# Patient Record
Sex: FEMALE | Race: BLACK OR AFRICAN AMERICAN | NOT HISPANIC OR LATINO | Employment: OTHER | URBAN - METROPOLITAN AREA
[De-identification: names, ages, dates, MRNs, and addresses within clinical notes are randomized per-mention and may not be internally consistent; named-entity substitution may affect disease eponyms.]

---

## 2019-10-02 ENCOUNTER — TELEPHONE (OUTPATIENT)
Dept: PHARMACY | Facility: CLINIC | Age: 59
End: 2019-10-02

## 2019-10-08 NOTE — TELEPHONE ENCOUNTER
Documentation Only: Prior Authorization for Epclusa IS NOT required Patient max co-pay: $3 with Medicaid Patient Assistance IS NOT required. Forwarding to clinical pharmacist for consult and shipment. CRISTELA

## 2019-10-10 ENCOUNTER — TELEPHONE (OUTPATIENT)
Dept: PHARMACY | Facility: CLINIC | Age: 59
End: 2019-10-10

## 2019-10-10 NOTE — TELEPHONE ENCOUNTER
(ag) Epclusa Initial:  Initial (ag)Epclusa consult completed on 10/10. Epclusa will be shipped on 10/10 to arrive on 10/11 at patient's home via [employer]. $0.00 from -001.  Patient plans to start Epclusa on 10/ Confirmed 2 patient identifies - name and . Therapy appropriate.      Counseling was reviewed: (ag)Epclusa - 12 weeks       1. Patient MUST take Epclusa at the same time every day with or without food.       2. Patient MUST avoid acid reducers without consulting myself or provider first. Antacids are to be spaced out at least 4 hours apart from Epclusa.      3. Potential side effects include: headaches and fatigue.  Headache: Patient may treat with OTC remedies. If Tylenol is used, dose should not exceed 2000mg per day.      4.Medication list reviewed. No allergies noted. Patient MUST contact myself or provider prior to starting any new OTC, herbal, or prescription drugs to avoid potential DDIs.     DDI's:   Atorvastatin - Patient will administer 10 mg (1 half) tablet during on (ag) Epclusa therapy.  Ranitidine - patient reports she is no longer adminstering ranitidine, patient advised that if she decides to take, she must adminster at the same time as (ag) Epclusa  Patient reported taking an antacid medication that is not on medication list, patient reports she will call back with the name.       Discussed the importance of staying well hydrated while on therapy. Compliance stressed, patient to take missed doses as soon as remember, but NOT to take 2 doses in one day. Patient will report questions or concerns to myself or practitioner.  Patient verbalizes understanding. Patient plans to start Epclusa on 10/11.  Consultation included: indication, goals of/ treatment, administration, storage and handling, side effects, how to handle missed doses, the importance of laboratory monitoring, the importance of keeping all follow up appointment. Patient should avoid sharing personal products such as razors,  toothbrushes, etc. Patient understands to report any medication changes to [facility] and provider All questions answered and addressed to patient's satisfaction, Follow-up will occur 7 days from start of treatment, [facility] to contact patient in 3 weeks for refills.  Provider notified of anticipated start date.

## 2019-10-14 ENCOUNTER — TELEPHONE (OUTPATIENT)
Dept: PHARMACY | Facility: CLINIC | Age: 59
End: 2019-10-14

## 2019-10-14 NOTE — TELEPHONE ENCOUNTER
Pt called on call Pharmacist 10/12/19 to inquire about starting AG Epclusa on 10/14 instead of 10/12.  Pt also reported now taking amitriptyline in the evenings.  Advised pt starting 10/14 was fine and no ddis with amitriptyline.

## 2019-10-21 ENCOUNTER — TELEPHONE (OUTPATIENT)
Dept: PHARMACY | Facility: CLINIC | Age: 59
End: 2019-10-21

## 2019-10-21 NOTE — TELEPHONE ENCOUNTER
Initial touch base conducted for AG Epclusa - Name/ confirmed.  Confirmed patient started medication as instructed on 10/14.  Patient confirms that they are taking AG Epclusa every day at 9am.  Patient denies skipping or missing doses.  Patient reports experiencing no side effects since beginning therapy. Patient reports no new medications, otc remedies, or allergies. Patient reminded of lab appointments.  Patient counseled on importance of maintaining adherence and keeping lab appointments which were scheduled.  Advised to call [facility] and provider if any issues arise. Aware [facility] will call for refills when patient has 7 days of medication on hand.    Pt asked if she could drink beer while on Epclusa. We advised pt that she should avoid alcohol while taking her Epclusa. Reinforced the importance of staying well hydrated during treatment.

## 2019-11-04 ENCOUNTER — TELEPHONE (OUTPATIENT)
Dept: PHARMACY | Facility: CLINIC | Age: 59
End: 2019-11-04

## 2019-11-04 NOTE — TELEPHONE ENCOUNTER
Ag Epclusa (2 of 3)  refill confirmed. We will ship AG Epclusa refill on  via [employer] to arrive on . $0.00 copay- 004. Confirmed 2 patient identifiers - name and .     Patient has 5 doses of AG Epclusa remaining and takes it around 8-9am daily.  Pt reports they are not having any side effects so far. No missed doses, no new medications, no new allergies or health conditions reported at this time. Confirmed that patient is still taking half Rosuvastatin 20mg (10mg total) daily. Patient also confirmed that she is still taking her Zantac at THE SAME TIME as her AG Epclusa. Patient reports not needing anything additional for breakthrough heartburn. All questions answered and addressed to patients satisfaction. Pt verbalized understanding.

## 2019-12-02 ENCOUNTER — TELEPHONE (OUTPATIENT)
Dept: PHARMACY | Facility: CLINIC | Age: 59
End: 2019-12-02

## 2019-12-02 NOTE — TELEPHONE ENCOUNTER
Refill readiness for Epclusa confirmed with patient; name/ confirmed; no missed doses; no new medications; no side effects noted; address confirmed for 12/3 shipment and  delivery.      VIPUL Walters.Ph., AAHIVP  Clinical Pharmacist, HIV/HCV  [facility]  Phone: [phone]

## 2020-01-24 PROBLEM — G47.33 OSA (OBSTRUCTIVE SLEEP APNEA): Status: ACTIVE | Noted: 2020-01-24

## 2020-01-24 PROBLEM — N12 PYELONEPHRITIS: Status: ACTIVE | Noted: 2020-01-24

## 2021-01-27 DIAGNOSIS — U07.1 COVID-19 VIRUS DETECTED: ICD-10-CM

## 2021-01-31 ENCOUNTER — NURSE TRIAGE (OUTPATIENT)
Dept: ADMINISTRATIVE | Facility: CLINIC | Age: 61
End: 2021-01-31

## 2021-01-31 PROBLEM — A41.9 SEPSIS: Status: ACTIVE | Noted: 2021-01-31

## 2021-01-31 PROBLEM — U07.1 PNEUMONIA DUE TO COVID-19 VIRUS: Status: ACTIVE | Noted: 2021-01-31

## 2021-01-31 PROBLEM — R65.10 SIRS (SYSTEMIC INFLAMMATORY RESPONSE SYNDROME): Status: ACTIVE | Noted: 2021-01-31

## 2021-01-31 PROBLEM — R00.0 SINUS TACHYCARDIA: Status: ACTIVE | Noted: 2021-01-31

## 2021-01-31 PROBLEM — J12.82 PNEUMONIA DUE TO COVID-19 VIRUS: Status: ACTIVE | Noted: 2021-01-31

## 2021-01-31 PROBLEM — U07.1 ACUTE HYPOXEMIC RESPIRATORY FAILURE DUE TO COVID-19: Status: ACTIVE | Noted: 2021-01-31

## 2021-01-31 PROBLEM — N12 PYELONEPHRITIS: Status: RESOLVED | Noted: 2020-01-24 | Resolved: 2021-01-31

## 2021-01-31 PROBLEM — J96.01 ACUTE HYPOXEMIC RESPIRATORY FAILURE DUE TO COVID-19: Status: ACTIVE | Noted: 2021-01-31

## 2021-02-04 ENCOUNTER — NURSE TRIAGE (OUTPATIENT)
Dept: ADMINISTRATIVE | Facility: CLINIC | Age: 61
End: 2021-02-04

## 2021-02-04 ENCOUNTER — TELEPHONE (OUTPATIENT)
Dept: ADMINISTRATIVE | Facility: OTHER | Age: 61
End: 2021-02-04

## 2021-02-08 ENCOUNTER — NURSE TRIAGE (OUTPATIENT)
Dept: ADMINISTRATIVE | Facility: CLINIC | Age: 61
End: 2021-02-08

## 2021-02-12 ENCOUNTER — NURSE TRIAGE (OUTPATIENT)
Dept: ADMINISTRATIVE | Facility: CLINIC | Age: 61
End: 2021-02-12

## 2021-02-23 PROBLEM — G47.33 OSA (OBSTRUCTIVE SLEEP APNEA): Status: RESOLVED | Noted: 2020-01-24 | Resolved: 2021-02-23

## 2021-02-23 PROBLEM — K92.0 HEMATEMESIS WITH NAUSEA: Status: ACTIVE | Noted: 2021-02-23

## 2021-02-23 PROBLEM — E78.5 HYPERLIPIDEMIA: Status: ACTIVE | Noted: 2021-02-23

## 2021-02-23 PROBLEM — K62.5 BRBPR (BRIGHT RED BLOOD PER RECTUM): Status: ACTIVE | Noted: 2021-02-23

## 2021-02-23 PROBLEM — R06.02 SHORTNESS OF BREATH: Status: RESOLVED | Noted: 2021-02-23 | Resolved: 2021-02-23

## 2021-02-23 PROBLEM — R06.02 SHORTNESS OF BREATH: Status: ACTIVE | Noted: 2021-02-23

## 2024-09-20 PROBLEM — L72.0 EPIDERMAL INCLUSION CYST: Chronic | Status: ACTIVE | Noted: 2024-09-20

## 2025-05-27 PROBLEM — R10.9 ABDOMINAL PAIN: Status: ACTIVE | Noted: 2025-05-27

## 2025-05-27 PROBLEM — R05.9 COUGH: Status: ACTIVE | Noted: 2025-05-27

## 2025-05-28 PROBLEM — R63.4 WEIGHT LOSS, UNINTENTIONAL: Status: ACTIVE | Noted: 2025-05-28

## 2025-05-29 PROBLEM — R65.10 SIRS (SYSTEMIC INFLAMMATORY RESPONSE SYNDROME): Status: ACTIVE | Noted: 2025-05-29

## 2025-05-30 ENCOUNTER — PATIENT MESSAGE (OUTPATIENT)
Dept: ADMINISTRATIVE | Facility: CLINIC | Age: 65
End: 2025-05-30

## 2025-05-30 ENCOUNTER — PATIENT OUTREACH (OUTPATIENT)
Dept: ADMINISTRATIVE | Facility: CLINIC | Age: 65
End: 2025-05-30

## 2025-05-30 NOTE — PROGRESS NOTES
C3 nurse attempted to contact Anyi Pepe  for a TCC post hospital discharge follow up call. NO LVM  . The patient has a scheduled HOSFU appointment with JESSICA Coe  on 06/12/2025 @ [id number].

## 2025-05-31 ENCOUNTER — TELEPHONE (OUTPATIENT)
Dept: ADMINISTRATIVE | Facility: CLINIC | Age: 65
End: 2025-05-31

## 2025-06-02 ENCOUNTER — NURSE TRIAGE (OUTPATIENT)
Dept: ADMINISTRATIVE | Facility: CLINIC | Age: 65
End: 2025-06-02

## 2025-06-09 PROBLEM — K29.70 GASTRITIS: Status: ACTIVE | Noted: 2025-06-09

## 2025-06-09 PROBLEM — R63.4 WEIGHT LOSS: Status: ACTIVE | Noted: 2025-06-09

## 2025-06-09 PROBLEM — R10.11 RUQ PAIN: Status: ACTIVE | Noted: 2025-06-09

## 2025-06-09 PROBLEM — K29.80 DUODENITIS: Status: ACTIVE | Noted: 2025-06-09
